# Patient Record
Sex: MALE | Race: BLACK OR AFRICAN AMERICAN | NOT HISPANIC OR LATINO | Employment: FULL TIME | URBAN - METROPOLITAN AREA
[De-identification: names, ages, dates, MRNs, and addresses within clinical notes are randomized per-mention and may not be internally consistent; named-entity substitution may affect disease eponyms.]

---

## 2018-02-11 ENCOUNTER — HOSPITAL ENCOUNTER (EMERGENCY)
Facility: HOSPITAL | Age: 43
Discharge: HOME | End: 2018-02-11
Attending: EMERGENCY MEDICINE
Payer: COMMERCIAL

## 2018-02-11 VITALS
WEIGHT: 293 LBS | HEART RATE: 75 BPM | TEMPERATURE: 99 F | OXYGEN SATURATION: 99 % | BODY MASS INDEX: 41.02 KG/M2 | HEIGHT: 71 IN | DIASTOLIC BLOOD PRESSURE: 100 MMHG | RESPIRATION RATE: 18 BRPM | SYSTOLIC BLOOD PRESSURE: 174 MMHG

## 2018-02-11 DIAGNOSIS — E11.65 POORLY CONTROLLED DIABETES MELLITUS: Primary | ICD-10-CM

## 2018-02-11 LAB
ALBUMIN SERPL BCP-MCNC: 4.1 G/DL
ALP SERPL-CCNC: 67 U/L
ALT SERPL W/O P-5'-P-CCNC: 48 U/L
ANION GAP SERPL CALC-SCNC: 14 MMOL/L
AST SERPL-CCNC: 57 U/L
B-OH-BUTYR BLD STRIP-SCNC: 0.6 MMOL/L
BASOPHILS # BLD AUTO: 0.02 K/UL
BASOPHILS NFR BLD: 0.2 %
BILIRUB SERPL-MCNC: 0.5 MG/DL
BUN SERPL-MCNC: 20 MG/DL
CALCIUM SERPL-MCNC: 11.2 MG/DL
CHLORIDE SERPL-SCNC: 94 MMOL/L
CO2 SERPL-SCNC: 23 MMOL/L
CREAT SERPL-MCNC: 1.2 MG/DL
DIFFERENTIAL METHOD: NORMAL
EOSINOPHIL # BLD AUTO: 0.1 K/UL
EOSINOPHIL NFR BLD: 1.7 %
ERYTHROCYTE [DISTWIDTH] IN BLOOD BY AUTOMATED COUNT: 13.2 %
EST. GFR  (AFRICAN AMERICAN): >60 ML/MIN/1.73 M^2
EST. GFR  (NON AFRICAN AMERICAN): >60 ML/MIN/1.73 M^2
GLUCOSE SERPL-MCNC: 412 MG/DL
HCT VFR BLD AUTO: 46.1 %
HGB BLD-MCNC: 15.4 G/DL
LYMPHOCYTES # BLD AUTO: 2.9 K/UL
LYMPHOCYTES NFR BLD: 34.8 %
MCH RBC QN AUTO: 29.1 PG
MCHC RBC AUTO-ENTMCNC: 33.4 G/DL
MCV RBC AUTO: 87 FL
MONOCYTES # BLD AUTO: 0.5 K/UL
MONOCYTES NFR BLD: 5.9 %
NEUTROPHILS # BLD AUTO: 4.8 K/UL
NEUTROPHILS NFR BLD: 57.2 %
PLATELET # BLD AUTO: 216 K/UL
PMV BLD AUTO: 11.1 FL
POCT GLUCOSE: 307 MG/DL (ref 70–110)
POCT GLUCOSE: 389 MG/DL (ref 70–110)
POTASSIUM SERPL-SCNC: 4.3 MMOL/L
PROT SERPL-MCNC: 8.5 G/DL
RBC # BLD AUTO: 5.3 M/UL
SODIUM SERPL-SCNC: 131 MMOL/L
WBC # BLD AUTO: 8.45 K/UL

## 2018-02-11 PROCEDURE — 81000 URINALYSIS NONAUTO W/SCOPE: CPT

## 2018-02-11 PROCEDURE — 82010 KETONE BODYS QUAN: CPT

## 2018-02-11 PROCEDURE — 99283 EMERGENCY DEPT VISIT LOW MDM: CPT | Mod: 25

## 2018-02-11 PROCEDURE — 80053 COMPREHEN METABOLIC PANEL: CPT

## 2018-02-11 PROCEDURE — 82962 GLUCOSE BLOOD TEST: CPT

## 2018-02-11 PROCEDURE — 96374 THER/PROPH/DIAG INJ IV PUSH: CPT

## 2018-02-11 PROCEDURE — 25000003 PHARM REV CODE 250: Performed by: EMERGENCY MEDICINE

## 2018-02-11 PROCEDURE — 85025 COMPLETE CBC W/AUTO DIFF WBC: CPT

## 2018-02-11 PROCEDURE — 63600175 PHARM REV CODE 636 W HCPCS: Performed by: EMERGENCY MEDICINE

## 2018-02-11 PROCEDURE — 96361 HYDRATE IV INFUSION ADD-ON: CPT

## 2018-02-11 RX ADMIN — INSULIN HUMAN 4 UNITS: 100 INJECTION, SOLUTION PARENTERAL at 09:02

## 2018-02-11 RX ADMIN — SODIUM CHLORIDE 2000 ML: 0.9 INJECTION, SOLUTION INTRAVENOUS at 08:02

## 2018-02-12 ENCOUNTER — HOSPITAL ENCOUNTER (EMERGENCY)
Facility: HOSPITAL | Age: 43
Discharge: HOME | End: 2018-02-12
Attending: EMERGENCY MEDICINE
Payer: COMMERCIAL

## 2018-02-12 VITALS
BODY MASS INDEX: 41.02 KG/M2 | SYSTOLIC BLOOD PRESSURE: 197 MMHG | RESPIRATION RATE: 18 BRPM | HEIGHT: 71 IN | HEART RATE: 82 BPM | OXYGEN SATURATION: 100 % | WEIGHT: 293 LBS | TEMPERATURE: 98 F | DIASTOLIC BLOOD PRESSURE: 101 MMHG

## 2018-02-12 DIAGNOSIS — I10 HYPERTENSION, UNSPECIFIED TYPE: ICD-10-CM

## 2018-02-12 DIAGNOSIS — R73.9 HYPERGLYCEMIA: Primary | ICD-10-CM

## 2018-02-12 LAB
ANION GAP SERPL CALC-SCNC: 14 MMOL/L
BACTERIA #/AREA URNS HPF: NORMAL /HPF
BILIRUB UR QL STRIP: NEGATIVE
BUN SERPL-MCNC: 20 MG/DL
CALCIUM SERPL-MCNC: 10.4 MG/DL
CHLORIDE SERPL-SCNC: 96 MMOL/L
CLARITY UR: CLEAR
CO2 SERPL-SCNC: 22 MMOL/L
COLOR UR: YELLOW
CREAT SERPL-MCNC: 1 MG/DL
EST. GFR  (AFRICAN AMERICAN): >60 ML/MIN/1.73 M^2
EST. GFR  (NON AFRICAN AMERICAN): >60 ML/MIN/1.73 M^2
GLUCOSE SERPL-MCNC: 372 MG/DL
GLUCOSE UR QL STRIP: ABNORMAL
HGB UR QL STRIP: ABNORMAL
KETONES UR QL STRIP: ABNORMAL
LEUKOCYTE ESTERASE UR QL STRIP: NEGATIVE
MICROSCOPIC COMMENT: NORMAL
NITRITE UR QL STRIP: NEGATIVE
PH UR STRIP: 6 [PH] (ref 5–8)
POCT GLUCOSE: 230 MG/DL (ref 70–110)
POCT GLUCOSE: 276 MG/DL (ref 70–110)
POCT GLUCOSE: 297 MG/DL (ref 70–110)
POCT GLUCOSE: 316 MG/DL (ref 70–110)
POCT GLUCOSE: 321 MG/DL (ref 70–110)
POTASSIUM SERPL-SCNC: 4.3 MMOL/L
PROT UR QL STRIP: NEGATIVE
RBC #/AREA URNS HPF: 1 /HPF (ref 0–4)
SODIUM SERPL-SCNC: 132 MMOL/L
SP GR UR STRIP: 1.01 (ref 1–1.03)
SQUAMOUS #/AREA URNS HPF: 2 /HPF
URN SPEC COLLECT METH UR: ABNORMAL
UROBILINOGEN UR STRIP-ACNC: NEGATIVE EU/DL
WBC #/AREA URNS HPF: 1 /HPF (ref 0–5)
YEAST URNS QL MICRO: NORMAL

## 2018-02-12 PROCEDURE — 63600175 PHARM REV CODE 636 W HCPCS: Performed by: EMERGENCY MEDICINE

## 2018-02-12 PROCEDURE — 96374 THER/PROPH/DIAG INJ IV PUSH: CPT

## 2018-02-12 PROCEDURE — 96376 TX/PRO/DX INJ SAME DRUG ADON: CPT

## 2018-02-12 PROCEDURE — 96361 HYDRATE IV INFUSION ADD-ON: CPT

## 2018-02-12 PROCEDURE — 99284 EMERGENCY DEPT VISIT MOD MDM: CPT | Mod: 25

## 2018-02-12 PROCEDURE — 82962 GLUCOSE BLOOD TEST: CPT

## 2018-02-12 PROCEDURE — 80048 BASIC METABOLIC PNL TOTAL CA: CPT

## 2018-02-12 PROCEDURE — 25000003 PHARM REV CODE 250: Performed by: EMERGENCY MEDICINE

## 2018-02-12 RX ORDER — CLONIDINE HYDROCHLORIDE 0.1 MG/1
0.1 TABLET ORAL
Status: COMPLETED | OUTPATIENT
Start: 2018-02-12 | End: 2018-02-12

## 2018-02-12 RX ORDER — SODIUM CHLORIDE 9 MG/ML
1000 INJECTION, SOLUTION INTRAVENOUS
Status: COMPLETED | OUTPATIENT
Start: 2018-02-12 | End: 2018-02-12

## 2018-02-12 RX ADMIN — SODIUM CHLORIDE 1000 ML: 0.9 INJECTION, SOLUTION INTRAVENOUS at 08:02

## 2018-02-12 RX ADMIN — INSULIN HUMAN 8 UNITS: 100 INJECTION, SOLUTION PARENTERAL at 08:02

## 2018-02-12 RX ADMIN — CLONIDINE HYDROCHLORIDE 0.1 MG: 0.1 TABLET ORAL at 01:02

## 2018-02-12 RX ADMIN — INSULIN HUMAN 6 UNITS: 100 INJECTION, SOLUTION PARENTERAL at 02:02

## 2018-02-12 NOTE — ED NOTES
2 pt identifiers for Bee Treadwell verified and correct.     Pt presents to ED with c/o hyperglycemia. Pt was seen in ED last night for same and was told if his sugar went back up to come back to ED. Pt states he woke up at 0330 with cbg 352, checked it again at 0630 and was 320's. Pt states he has been off of his metformin for several months bc he didn't have it but did not have issues with his sugar during that time. It was not until recently he started having uncontrolled CBG. Pt placed on continuous pulse ox & bp cuff. Will continue to monitor.

## 2018-02-12 NOTE — ED PROVIDER NOTES
Encounter Date: 2/12/2018    SCRIBE #1 NOTE: I, Maurice Salas, am scribing for, and in the presence of,  Dr. Miguel Allen. I have scribed the entire note.       History     Chief Complaint   Patient presents with    Hyperglycemia     elevated cbg. recently seen in ED for same compalint     Time patient was seen by the provider: 7:53 AM      The patient is a 42 y.o. male with co-morbidities including: NIDDM, HTN who presents to the ED with a complaint of hyperglycemia. Th pt is a diabetic, and he takes his medication (metformin) normally. He takes his glucose levels at home, and was concerned with his level of 397. He was seen in the ED earlier in the night for a spike, was given IV fluids and insulin and discharged home, but noticed a spike again this morning, prompting his return after being given return precautions. His DM diagnosis is relatively new. He is not in any acute distress, denies feeling any symptoms associated with the hyperglycemia other than some occasional night sweats. He takes lisinopril for his HTN. Non smoker, occasional light drinker. No history of recent surgery, no recent illness.             Review of patient's allergies indicates:   Allergen Reactions    Pcn [penicillins]      Past Medical History:   Diagnosis Date    Diabetes mellitus, type 2     Hypertension      No past surgical history on file.  No family history on file.  Social History   Substance Use Topics    Smoking status: Never Smoker    Smokeless tobacco: Never Used    Alcohol use Yes     Review of Systems   Endocrine:        Hyperglycemic, 370's   All other systems reviewed and are negative.      Physical Exam     Initial Vitals [02/12/18 0744]   BP Pulse Resp Temp SpO2   (!) 205/109 99 18 98.3 °F (36.8 °C) 96 %      MAP       141         Physical Exam    Nursing note and vitals reviewed.  Constitutional: He appears well-developed and well-nourished. He is diaphoretic (forehead, mild). No distress.   obese  A&o x4    somewhat anxious   NAD   HENT:   Head: Normocephalic and atraumatic.   Eyes: Conjunctivae are normal. Pupils are equal, round, and reactive to light.   Neck: Normal range of motion. Neck supple.   Cardiovascular: Normal rate.   , sinus tachycardia   Pulmonary/Chest: No respiratory distress.   Abdominal: He exhibits no distension.   Musculoskeletal: Normal range of motion.   1+ edema LE bilaterally   Neurological: He is alert and oriented to person, place, and time.   Psychiatric: He has a normal mood and affect.         ED Course   Procedures  Labs Reviewed - No data to display          Medical Decision Making:   Initial Assessment:   She is a 42-year-old black male with a history of hypertension and diabetes who presents for the second time in the last 12 hours for elevated blood sugar patient had been treated with insulin and saline during his prior admission however states his blood pressure got just around the 300 range when he was discharged.  Patient went home and had some eggs.  Then became extremely concerned because his lupus started climbing about 350 range again.  Patient denies any new symptoms or any recent illness no fever chills nausea vomiting diarrhea no chest pain and trouble breathing.  Patient does note some polyuria without fever chills or dysuria.  Differential Diagnosis:   Differential diagnosis uncontrolled diabetes mellitus  with hyperglycemia, diabetic ketoacidosis,metabolic derangement, viral illness,  Clinical Tests:   Lab Tests: Reviewed  Medical Tests: Reviewed  ED Management:  The patient was given intravenous saline as well as intravenous insulin to get his blood glucose down in the upper 100 range at which point he was discharged home he was told to increase his metformin dose to 500 mg twice a day and told he will need close follow-up with a endocrinologist or his primary care doctor to get his glucose under better control.  Patient also may be consideration for other  medications to control his glucose.  He's also was instructed to return should his symptoms worsen should his blood glucose today above the 300 range or should he develop normal nausea vomiting diarrhea or fever.  Patient is instructed to double the dose of his metformin now to 500 mg twice a day.  Patient also in was instructed that his blood pressure has been elevated through both with the ED visits and it is recommended that he follow-up with his primary care physician for adjustment of his blood pressure medication.  He is also instructed to check pressure at least twice daily and keep a log.                   ED Course      Clinical Impression:uncontrolled dm,htn   There were no encounter diagnoses.    Disposition:   Disposition: Discharged  Condition: Stable                        Miguel Allen MD  02/12/18 6603       Miguel Allen MD  02/12/18 9253

## 2018-02-12 NOTE — ED PROVIDER NOTES
Encounter Date: 2/11/2018       History     Chief Complaint   Patient presents with    Hyperglycemia     Patient presents to the ED with reports of having a glucose of 397 at home.     Patient is a 42-year-old male with non-insulin-dependent diabetes who says he has been having elevated blood sugars over the past couple of days.  He was out of metformin before restarting it a couple days ago.  He is currently only taking it once a day.  He denies abdominal pain, nausea or vomiting.  He denies URI symptoms.  No dysuria.      The history is provided by the patient.     Review of patient's allergies indicates:   Allergen Reactions    Pcn [penicillins]      Past Medical History:   Diagnosis Date    Diabetes mellitus, type 2     Hypertension      History reviewed. No pertinent surgical history.  History reviewed. No pertinent family history.  Social History   Substance Use Topics    Smoking status: Never Smoker    Smokeless tobacco: Never Used    Alcohol use Yes     Review of Systems   Constitutional: Negative for chills and fever.   Eyes: Negative for visual disturbance.   Respiratory: Negative for shortness of breath.    Cardiovascular: Negative for chest pain.   Gastrointestinal: Negative for abdominal pain, nausea and vomiting.   Endocrine: Positive for polydipsia and polyuria.   Neurological: Negative for light-headedness.   All other systems reviewed and are negative.      Physical Exam     Initial Vitals [02/11/18 1924]   BP Pulse Resp Temp SpO2   (!) 163/103 95 18 98.5 °F (36.9 °C) 97 %      MAP       123         Physical Exam    Nursing note and vitals reviewed.  Constitutional: He appears well-developed and well-nourished.   HENT:   Head: Normocephalic and atraumatic.   Eyes: EOM are normal. Pupils are equal, round, and reactive to light.   Neck: Normal range of motion. Neck supple.   Cardiovascular: Normal rate and regular rhythm.   Pulmonary/Chest: Breath sounds normal.   Abdominal: Soft. There is no  rebound and no guarding.   Musculoskeletal: Normal range of motion.   Neurological: He is alert and oriented to person, place, and time. No cranial nerve deficit.   Skin: Skin is warm and dry.   Psychiatric: He has a normal mood and affect.         ED Course   Procedures  Labs Reviewed   COMPREHENSIVE METABOLIC PANEL - Abnormal; Notable for the following:        Result Value    Sodium 131 (*)     Chloride 94 (*)     Glucose 412 (*)     Calcium 11.2 (*)     Total Protein 8.5 (*)     AST 57 (*)     ALT 48 (*)     All other components within normal limits   URINALYSIS - Abnormal; Notable for the following:     Specific Gravity, UA >=1.030 (*)     Protein, UA Trace (*)     Occult Blood UA 3+ (*)     Urobilinogen, UA 2.0-3.0 (*)     All other components within normal limits   BETA - HYDROXYBUTYRATE, SERUM - Abnormal; Notable for the following:     Beta-Hydroxybutyrate 0.6 (*)     All other components within normal limits   POCT GLUCOSE - Abnormal; Notable for the following:     POCT Glucose 389 (*)     All other components within normal limits   POCT GLUCOSE - Abnormal; Notable for the following:     POCT Glucose 307 (*)     All other components within normal limits   CBC W/ AUTO DIFFERENTIAL   URINALYSIS MICROSCOPIC             Medical Decision Making:   ED Management:  42-year-old non-insulin-dependent diabetic who just recently restarted metformin.  He was noted with an elevated glucose here in the ED and was given IV fluids and a small dose of insulin.  He has a normal anion gap and CO2 and I do not suspect DKA.  Urinalysis reported showed 3+ blood, although this was noted to be a lab error and patient's urine actually had 3+ glucose.  No signs of infection.  He'll be discharged in stable condition and is been advised to take metformin twice a day instead of once.  He will follow-up with his primary physician as soon as able for recheck.                   ED Course      Clinical Impression:   The encounter diagnosis  was Poorly controlled diabetes mellitus.                           Nigel Vazquez MD  02/12/18 7017

## 2018-02-12 NOTE — ED TRIAGE NOTES
Pt has hx of diabetes, states blood glucose at home was high over the past couple of days. Pt states has rx at home for metformin that he took but blood glucose did not come down. Pt reports feeling tired and sluggish. Pt states he has not followed up with dm diagnosis because he has not seen an md as he should have. Pt denies pain at this time.

## 2019-02-25 ENCOUNTER — HOSPITAL ENCOUNTER (EMERGENCY)
Facility: HOSPITAL | Age: 44
Discharge: HOME | End: 2019-02-25
Attending: EMERGENCY MEDICINE
Payer: COMMERCIAL

## 2019-02-25 VITALS
TEMPERATURE: 100 F | WEIGHT: 270 LBS | SYSTOLIC BLOOD PRESSURE: 135 MMHG | HEART RATE: 95 BPM | RESPIRATION RATE: 20 BRPM | OXYGEN SATURATION: 96 % | HEIGHT: 71 IN | BODY MASS INDEX: 37.8 KG/M2 | DIASTOLIC BLOOD PRESSURE: 81 MMHG

## 2019-02-25 DIAGNOSIS — J18.9 PNEUMONIA OF LEFT LOWER LOBE DUE TO INFECTIOUS ORGANISM: Primary | ICD-10-CM

## 2019-02-25 DIAGNOSIS — R05.9 COUGH: ICD-10-CM

## 2019-02-25 LAB
ANION GAP SERPL CALC-SCNC: 16 MMOL/L
BASOPHILS # BLD AUTO: 0.03 K/UL
BASOPHILS NFR BLD: 0.3 %
BUN SERPL-MCNC: 9 MG/DL
CALCIUM SERPL-MCNC: 9.8 MG/DL
CHLORIDE SERPL-SCNC: 94 MMOL/L
CO2 SERPL-SCNC: 27 MMOL/L
CREAT SERPL-MCNC: 1.1 MG/DL
DIFFERENTIAL METHOD: ABNORMAL
EOSINOPHIL # BLD AUTO: 0.1 K/UL
EOSINOPHIL NFR BLD: 1.4 %
ERYTHROCYTE [DISTWIDTH] IN BLOOD BY AUTOMATED COUNT: 13.3 %
EST. GFR  (AFRICAN AMERICAN): >60 ML/MIN/1.73 M^2
EST. GFR  (NON AFRICAN AMERICAN): >60 ML/MIN/1.73 M^2
GLUCOSE SERPL-MCNC: 355 MG/DL
HCT VFR BLD AUTO: 37.9 %
HGB BLD-MCNC: 12 G/DL
LYMPHOCYTES # BLD AUTO: 1.6 K/UL
LYMPHOCYTES NFR BLD: 17.2 %
MCH RBC QN AUTO: 28.4 PG
MCHC RBC AUTO-ENTMCNC: 31.7 G/DL
MCV RBC AUTO: 90 FL
MONOCYTES # BLD AUTO: 0.9 K/UL
MONOCYTES NFR BLD: 9.5 %
NEUTROPHILS # BLD AUTO: 6.8 K/UL
NEUTROPHILS NFR BLD: 71.8 %
PLATELET # BLD AUTO: 328 K/UL
PLATELET BLD QL SMEAR: ABNORMAL
PMV BLD AUTO: 10.8 FL
POCT GLUCOSE: 266 MG/DL (ref 70–110)
POCT GLUCOSE: 313 MG/DL (ref 70–110)
POTASSIUM SERPL-SCNC: 3.7 MMOL/L
RBC # BLD AUTO: 4.23 M/UL
SODIUM SERPL-SCNC: 137 MMOL/L
WBC # BLD AUTO: 9.52 K/UL

## 2019-02-25 PROCEDURE — 99284 EMERGENCY DEPT VISIT MOD MDM: CPT | Mod: 25

## 2019-02-25 PROCEDURE — 87040 BLOOD CULTURE FOR BACTERIA: CPT

## 2019-02-25 PROCEDURE — 25000003 PHARM REV CODE 250: Performed by: PHYSICIAN ASSISTANT

## 2019-02-25 PROCEDURE — 82962 GLUCOSE BLOOD TEST: CPT

## 2019-02-25 PROCEDURE — 96360 HYDRATION IV INFUSION INIT: CPT

## 2019-02-25 PROCEDURE — 85025 COMPLETE CBC W/AUTO DIFF WBC: CPT

## 2019-02-25 PROCEDURE — 96361 HYDRATE IV INFUSION ADD-ON: CPT

## 2019-02-25 PROCEDURE — 80048 BASIC METABOLIC PNL TOTAL CA: CPT

## 2019-02-25 RX ORDER — CETIRIZINE HYDROCHLORIDE 10 MG/1
10 TABLET ORAL DAILY
Qty: 30 TABLET | Refills: 0 | Status: SHIPPED | OUTPATIENT
Start: 2019-02-25 | End: 2020-06-11

## 2019-02-25 RX ORDER — ACETAMINOPHEN 500 MG
1000 TABLET ORAL
Status: COMPLETED | OUTPATIENT
Start: 2019-02-25 | End: 2019-02-25

## 2019-02-25 RX ORDER — KETOROLAC TROMETHAMINE 10 MG/1
10 TABLET, FILM COATED ORAL
Status: COMPLETED | OUTPATIENT
Start: 2019-02-25 | End: 2019-02-25

## 2019-02-25 RX ORDER — GUAIFENESIN AND DEXTROMETHORPHAN HYDROBROMIDE 1200; 60 MG/1; MG/1
1 TABLET, EXTENDED RELEASE ORAL 2 TIMES DAILY PRN
Qty: 30 TABLET | Refills: 0 | Status: SHIPPED | OUTPATIENT
Start: 2019-02-25 | End: 2019-03-07

## 2019-02-25 RX ORDER — AZITHROMYCIN 250 MG/1
500 TABLET, FILM COATED ORAL
Status: COMPLETED | OUTPATIENT
Start: 2019-02-25 | End: 2019-02-25

## 2019-02-25 RX ORDER — IBUPROFEN 600 MG/1
600 TABLET ORAL EVERY 6 HOURS PRN
Qty: 20 TABLET | Refills: 0 | Status: SHIPPED | OUTPATIENT
Start: 2019-02-25 | End: 2020-06-11

## 2019-02-25 RX ORDER — AZITHROMYCIN 250 MG/1
250 TABLET, FILM COATED ORAL DAILY
Qty: 4 TABLET | Refills: 0 | Status: SHIPPED | OUTPATIENT
Start: 2019-02-25 | End: 2019-03-01

## 2019-02-25 RX ADMIN — SODIUM CHLORIDE 1000 ML: 0.9 INJECTION, SOLUTION INTRAVENOUS at 11:02

## 2019-02-25 RX ADMIN — KETOROLAC TROMETHAMINE 10 MG: 10 TABLET, FILM COATED ORAL at 10:02

## 2019-02-25 RX ADMIN — ACETAMINOPHEN 1000 MG: 500 TABLET ORAL at 10:02

## 2019-02-25 RX ADMIN — AZITHROMYCIN 500 MG: 250 TABLET, FILM COATED ORAL at 11:02

## 2019-02-25 NOTE — ED NOTES
Pt seen in the ED complaining of a cough for the past three weeks, pt has not been able to cough up anything and he denies any blood in sputum or saliva. Pt denies any N/V, diarrhea, fever, or chills. Pt stable, will continue to monitor.

## 2019-02-28 ENCOUNTER — HOSPITAL ENCOUNTER (EMERGENCY)
Facility: HOSPITAL | Age: 44
Discharge: HOME | End: 2019-02-28
Attending: EMERGENCY MEDICINE
Payer: COMMERCIAL

## 2019-02-28 VITALS
DIASTOLIC BLOOD PRESSURE: 75 MMHG | SYSTOLIC BLOOD PRESSURE: 132 MMHG | TEMPERATURE: 100 F | HEIGHT: 71 IN | WEIGHT: 270 LBS | RESPIRATION RATE: 18 BRPM | HEART RATE: 116 BPM | OXYGEN SATURATION: 96 % | BODY MASS INDEX: 37.8 KG/M2

## 2019-02-28 DIAGNOSIS — R05.9 COUGH: ICD-10-CM

## 2019-02-28 DIAGNOSIS — R73.9 ELEVATED SERUM GLUCOSE: Primary | ICD-10-CM

## 2019-02-28 LAB — POCT GLUCOSE: 321 MG/DL (ref 70–110)

## 2019-02-28 PROCEDURE — 25000003 PHARM REV CODE 250: Performed by: PHYSICIAN ASSISTANT

## 2019-02-28 PROCEDURE — 99283 EMERGENCY DEPT VISIT LOW MDM: CPT | Mod: 25

## 2019-02-28 PROCEDURE — 82962 GLUCOSE BLOOD TEST: CPT

## 2019-02-28 RX ORDER — VALSARTAN 320 MG/1
300 TABLET ORAL DAILY
COMMUNITY
End: 2020-06-11 | Stop reason: SDUPTHER

## 2019-02-28 RX ORDER — BENZONATATE 100 MG/1
100 CAPSULE ORAL 3 TIMES DAILY PRN
Qty: 20 CAPSULE | Refills: 0 | Status: SHIPPED | OUTPATIENT
Start: 2019-02-28 | End: 2019-03-10

## 2019-02-28 RX ORDER — IBUPROFEN 600 MG/1
600 TABLET ORAL
Status: COMPLETED | OUTPATIENT
Start: 2019-02-28 | End: 2019-02-28

## 2019-02-28 RX ADMIN — IBUPROFEN 600 MG: 600 TABLET, FILM COATED ORAL at 01:02

## 2019-02-28 NOTE — ED PROVIDER NOTES
Encounter Date: 2/28/2019       History     Chief Complaint   Patient presents with    Hyperglycemia     reports glucose reading of 318. reports being dx with pneumonia Monday. reports is taking antibiotics. reports had episode of diarrhea today.      44 yo male with PMH of DM 2, HTN presents the ED with complaints of elevated blood glucose levels times 3-4 days and continued cough times 3-4 days.  Patient states he was diagnosed with pneumonia 4 days ago.  He has been taking the antibiotics and Mucinex DM as prescribed but complains of continued cough.  He states that he has been checking his BG levels daily and noticed that they are continually in the 200 and 300 range.  He is normally in the 100 range.  He states he is taking his metformin and Victoza daily as prescribed.  He denies any associated symptoms such as nausea, vomiting, fatigue, blurry vision, headache, polydipsia, polyuria.  He has not followed up with his PCP regarding this issue.      The history is provided by the patient. No  was used.     Review of patient's allergies indicates:   Allergen Reactions    Pcn [penicillins]      Past Medical History:   Diagnosis Date    Diabetes mellitus, type 2     Hypertension      History reviewed. No pertinent surgical history.  History reviewed. No pertinent family history.  Social History     Tobacco Use    Smoking status: Never Smoker    Smokeless tobacco: Never Used   Substance Use Topics    Alcohol use: Yes    Drug use: No     Review of Systems   Constitutional: Negative for chills, fatigue and fever.   HENT: Negative for congestion.    Respiratory: Positive for cough.    Gastrointestinal: Negative for abdominal pain, diarrhea, nausea and vomiting.   Endocrine: Negative for polydipsia and polyuria.   Neurological: Negative for headaches.   Psychiatric/Behavioral: Negative for confusion.   All other systems reviewed and are negative.      Physical Exam     Initial Vitals [02/28/19  0038]   BP Pulse Resp Temp SpO2   126/84 (!) 124 18 99.7 °F (37.6 °C) 96 %      MAP       --         Physical Exam    Nursing note and vitals reviewed.  Constitutional: He appears well-developed and well-nourished. He is Obese . No distress.   HENT:   Head: Normocephalic and atraumatic.   Nose: Nose normal.   Eyes: Conjunctivae are normal.   Neck: Normal range of motion.   Cardiovascular: Regular rhythm and normal heart sounds. Tachycardia present.    Pulmonary/Chest: Effort normal and breath sounds normal. He has no decreased breath sounds. He has no wheezes. He has no rales.   Abdominal: Soft. Bowel sounds are normal. There is no tenderness.   Musculoskeletal: Normal range of motion.   Neurological: He is alert and oriented to person, place, and time.   Skin: Skin is warm and dry.   Psychiatric: He has a normal mood and affect. His speech is normal and behavior is normal. Judgment and thought content normal. Cognition and memory are normal.         ED Course   Procedures  Labs Reviewed   POCT GLUCOSE - Abnormal; Notable for the following components:       Result Value    POCT Glucose 321 (*)     All other components within normal limits   POCT GLUCOSE MONITORING CONTINUOUS          Imaging Results    None          Medical Decision Making:   Initial Assessment:   42 yo male with elevated BG levels x 3-4 days and continued cough. Diagnosed with pneumonia 4 days ago, still taking azithromycin and mucinex DM. Patient appears well with no wheezing, rales, or decreased breath sounds.mild tachycardia at 116, repeat temperature noted at 100.2F, this is likely the source of the mild tachycardia. No abdominal tenderness. Moist mucous membranes.   Differential Diagnosis:   Elevated BG, pneumonia  ED Management:  Ibuprofen given in ED. No additional intervention warranted at this time as the patient is asymptomatic. He will be discharged with rx for tessalon Perles for cough relief and instructed to continue to monitor his BG  and follow up with his PCP within 1 week if they remain elevated. Strict return precautions given. Patient states understanding and agreement with treatment plan. I discussed this patient with Dr Diamond, who agrees.   Other:   I have discussed this case with another health care provider.                      Clinical Impression:       ICD-10-CM ICD-9-CM   1. Elevated serum glucose R73.9 790.29   2. Cough R05 786.2                                Michelle Pierson PA-C  02/28/19 0124

## 2019-02-28 NOTE — DISCHARGE INSTRUCTIONS
Monitor blood sugar, if it remains elevated for another week, follow up with your PCP. Return to the ER if you begin having vomiting, abdominal pain, excessive thirst or urination.

## 2019-02-28 NOTE — ED TRIAGE NOTES
Pt. To the ER with c/o having an elevated blood glucose. Pt. Was diagnosed with pneumonia last week and will be finished his course of antibiotics today. Breath sounds are clear bilaterally and skin is PWD. Pt. Denies c/o fever.

## 2019-03-02 LAB — BACTERIA BLD CULT: NORMAL

## 2019-03-07 NOTE — ED PROVIDER NOTES
Encounter Date: 2/25/2019       History     Chief Complaint   Patient presents with    Cough     c/o cough for the past 3 weeks. Also c/o pain to left lower ribs since Saturday. Also states CBG has been running in the 300's     [facility] 43 y.o. afebrile male with PMH of DM and HTN presented to the ED with c/o continued URI and cough.  He states symptoms were initially mild and included nasal congestion, postnasal drip and dry cough.  He states that this has continued for approximately 3 weeks.  He does report that he has occasional productive cough.  Does state that the cough is worse at night.  He states that he has some left-sided rib pain associated with the cough however denies any pain at rest.  Patient also to has noted that his blood glucose has been more difficult despite taking his regular medications.  He states that it has been a and approximately the 300 over the past few days.  Patient denies any definite fever however complains of chills and generalized body aches.  Patient denies any headache, shortness of breath, wheezing, nausea, vomiting, diarrhea or rash. He has not tried any medications for the symptoms today.        The history is provided by the patient.     Review of patient's allergies indicates:   Allergen Reactions    Pcn [penicillins]      Past Medical History:   Diagnosis Date    Diabetes mellitus, type 2     Hypertension      History reviewed. No pertinent surgical history.  History reviewed. No pertinent family history.  Social History     Tobacco Use    Smoking status: Never Smoker    Smokeless tobacco: Never Used   Substance Use Topics    Alcohol use: Yes    Drug use: No     Review of Systems   Constitutional: Positive for chills. Negative for activity change, appetite change and fever.   HENT: Positive for congestion, postnasal drip, sinus pressure and sore throat.    Respiratory: Positive for cough. Negative for shortness of breath and wheezing.    Cardiovascular:  Positive for chest pain (Left lateral rib pain exclusively with coughing episodes).   Gastrointestinal: Negative for nausea and vomiting.   Genitourinary: Negative for decreased urine volume and dysuria.   Musculoskeletal: Positive for arthralgias. Negative for back pain, neck pain and neck stiffness.   Skin: Negative for rash.   Neurological: Negative for dizziness, weakness, light-headedness and headaches.   Hematological: Does not bruise/bleed easily.       Physical Exam     Initial Vitals [02/25/19 0944]   BP Pulse Resp Temp SpO2   (!) 177/95 (!) 121 18 99.8 °F (37.7 °C) 96 %      MAP       --         Vitals:    02/25/19 1302   BP: 135/81   Pulse: 95   Resp:    Temp:        Physical Exam    Nursing note and vitals reviewed.  Constitutional: He appears well-developed and well-nourished. He is cooperative.  Non-toxic appearance. He appears ill. No distress.   HENT:   Head: Normocephalic and atraumatic.   Nose: Mucosal edema present.   Mouth/Throat: Mucous membranes are dry. No posterior oropharyngeal edema or posterior oropharyngeal erythema.   Eyes: Conjunctivae and lids are normal.   Neck: Trachea normal and normal range of motion. Neck supple. No stridor present.   Cardiovascular: Normal rate and regular rhythm.   Pulmonary/Chest: Breath sounds normal. No respiratory distress. He has no wheezes. He has no rhonchi.   Abdominal: Soft. Normal appearance. There is no tenderness. There is no rebound.   Lymphadenopathy:     He has no cervical adenopathy.   Neurological: He is alert and oriented to person, place, and time. GCS eye subscore is 4. GCS verbal subscore is 5. GCS motor subscore is 6.   Skin: Skin is warm, dry and intact. No rash noted.   Psychiatric: He has a normal mood and affect. His speech is normal and behavior is normal. Thought content normal.         ED Course   Procedures  Labs Reviewed   CBC W/ AUTO DIFFERENTIAL - Abnormal; Notable for the following components:       Result Value    RBC 4.23 (*)      Hemoglobin 12.0 (*)     Hematocrit 37.9 (*)     MCHC 31.7 (*)     Lymph% 17.2 (*)     All other components within normal limits   BASIC METABOLIC PANEL - Abnormal; Notable for the following components:    Chloride 94 (*)     Glucose 355 (*)     All other components within normal limits   POCT GLUCOSE - Abnormal; Notable for the following components:    POCT Glucose 266 (*)     All other components within normal limits   POCT GLUCOSE - Abnormal; Notable for the following components:    POCT Glucose 313 (*)     All other components within normal limits   CULTURE, BLOOD          Imaging Results          X-Ray Chest PA And Lateral (Final result)  Result time 02/25/19 10:28:08    Final result by Quintin Busby MD (02/25/19 10:28:08)                 Impression:      Consolidation lateral basilar segment of the left lower lobe, likely representing a pneumonia.  Follow-up radiographs to clearing are suggested.      Electronically signed by: Quintin Busby MD  Date:    02/25/2019  Time:    10:28             Narrative:    EXAMINATION:  XR CHEST PA AND LATERAL    CLINICAL HISTORY:  Cough    TECHNIQUE:  PA and lateral views of the chest were performed.    COMPARISON:  None    FINDINGS:  There is an extensive airspace consolidation noted in the lateral basilar segment of the left lower lobe, likely representing a pneumonia.  Slight increased coarse markings in the posterior basilar segment of the left lower lobe.  Left upper lung zone is clear.  Right lung is clear.  Heart size is within normal limits.  Mediastinum is unremarkable.  Mild spondylotic changes in the dorsal spine.                                [facility] 43 [facility] afebrile male with PMH of DM and HTN presented to the ED with c/o continued URI and cough.  He states symptoms were initially mild and included nasal congestion, postnasal drip and dry cough.  He states that this has continued for approximately 3 weeks.  He does report that he has occasional  productive cough.  Does state that the cough is worse at night.  He states that he has some left-sided rib pain associated with the cough however denies any pain at rest.  Patient also to has noted that his blood glucose has been more difficult despite taking his regular medications.  He states that it has been a and approximately the 300 over the past few days.  Patient denies any definite fever however complains of chills and generalized body aches.  Patient denies any headache, shortness of breath, wheezing, nausea, vomiting, diarrhea or rash. He has not tried any medications for the symptoms today.  ROS positive for URI symptoms.  Physical exam reveals patient that appears ill but nontoxic. TM's with bilateral serous otitis media; nose with congestion and rhinorrhea. Oropharynx with mild erythema; no edema or exudate; mildly dry mucous membranes. Lungs clear and free of wheeze. Heart with some tachycardia that did improve throughout ED course; normal rhythm. Abdomen is soft and nontender with normal bowel sounds. FROM of neck, no lymphadenopathy and FROM of all extremities with strength 5/5 bilaterally. Skin free of rash, pallor and diaphoresis.    DDX: influenza, viral URI with cough, bronchitis, pneumonia    ED management:  No flu swab as symptom duration place this patient out of Tamiflu treatment window.  Patient was noted to be hyperglycemic in triage.  With patient's comorbidities and chest x-ray findings of early developing pneumonia labs were ordered.  Patient's curb score is 0 and believe he would benefit from outpatient treatment.  First dose of antibiotics was given in the ED.  Initial modest improvement in symptoms with Tylenol and Toradol.  Moderate improvement with IV fluids.  No further labs or imaging warranted at this time as patient remains well appearing and in no distress at this time.  We will send home with supportive medications and antibiotics for left lower lobe pneumonia and instructed  to follow up with his PCP. Instructed patient on fever and symptom control.      Impression/Plan: The primary encounter diagnosis was Pneumonia of left lower lobe due to infectious organism. A diagnosis of Cough was also pertinent to this visit. Discharged with Motrin, Zyrtec, Mucinex DM and Zithromax . Patient will follow up with Primary.  Patient cautioned on when to return to ED.  Pt. Understands and agrees with current treatment plan                                              Clinical Impression:       ICD-10-CM ICD-9-CM   1. Pneumonia of left lower lobe due to infectious organism J18.1 486   2. Cough R05 786.2                                GILBERTO Murray  03/06/19 2101

## 2020-06-11 PROBLEM — I10 ESSENTIAL HYPERTENSION: Status: ACTIVE | Noted: 2018-02-09

## 2020-06-11 PROBLEM — E11.9 TYPE 2 DIABETES MELLITUS WITHOUT COMPLICATION: Status: ACTIVE | Noted: 2018-02-09

## 2020-06-11 PROBLEM — M10.9 GOUT: Status: ACTIVE | Noted: 2020-06-11

## 2022-02-06 ENCOUNTER — HOSPITAL ENCOUNTER (EMERGENCY)
Facility: HOSPITAL | Age: 47
Discharge: HOME | End: 2022-02-06
Attending: EMERGENCY MEDICINE

## 2022-02-06 VITALS
HEART RATE: 95 BPM | RESPIRATION RATE: 18 BRPM | WEIGHT: 258 LBS | DIASTOLIC BLOOD PRESSURE: 98 MMHG | TEMPERATURE: 98 F | SYSTOLIC BLOOD PRESSURE: 188 MMHG | HEIGHT: 71 IN | OXYGEN SATURATION: 96 % | BODY MASS INDEX: 36.12 KG/M2

## 2022-02-06 DIAGNOSIS — I15.9 SECONDARY HYPERTENSION: Primary | ICD-10-CM

## 2022-02-06 DIAGNOSIS — M10.9 ACUTE GOUT OF LEFT ELBOW, UNSPECIFIED CAUSE: ICD-10-CM

## 2022-02-06 PROCEDURE — 99284 EMERGENCY DEPT VISIT MOD MDM: CPT | Mod: 25

## 2022-02-06 PROCEDURE — 96372 THER/PROPH/DIAG INJ SC/IM: CPT

## 2022-02-06 PROCEDURE — 63600175 PHARM REV CODE 636 W HCPCS: Performed by: EMERGENCY MEDICINE

## 2022-02-06 RX ORDER — KETOROLAC TROMETHAMINE 30 MG/ML
30 INJECTION, SOLUTION INTRAMUSCULAR; INTRAVENOUS
Status: COMPLETED | OUTPATIENT
Start: 2022-02-06 | End: 2022-02-06

## 2022-02-06 RX ORDER — INDOMETHACIN 50 MG/1
50 CAPSULE ORAL
Qty: 21 CAPSULE | Refills: 0 | Status: SHIPPED | OUTPATIENT
Start: 2022-02-06 | End: 2022-02-06 | Stop reason: SDUPTHER

## 2022-02-06 RX ORDER — INDOMETHACIN 50 MG/1
50 CAPSULE ORAL
Qty: 21 CAPSULE | Refills: 0 | Status: SHIPPED | OUTPATIENT
Start: 2022-02-06 | End: 2022-02-13

## 2022-02-06 RX ORDER — LOSARTAN POTASSIUM AND HYDROCHLOROTHIAZIDE 25; 100 MG/1; MG/1
1 TABLET ORAL DAILY
Qty: 90 TABLET | Refills: 3 | Status: SHIPPED | OUTPATIENT
Start: 2022-02-06 | End: 2023-02-06

## 2022-02-06 RX ADMIN — KETOROLAC TROMETHAMINE 30 MG: 30 INJECTION, SOLUTION INTRAMUSCULAR at 08:02

## 2022-02-06 NOTE — Clinical Note
"Bee Lopez" Mile was seen and treated in our emergency department on 2/6/2022.  He may return to work on 02/07/2022.       If you have any questions or concerns, please don't hesitate to call.      ALEJANDRA Donovan RN    "
"Bee Lopez" Mile was seen and treated in our emergency department on 2/6/2022.  He may return to work on 02/09/2022.       If you have any questions or concerns, please don't hesitate to call.      Jersey Daugherty MD"
118

## 2022-02-06 NOTE — ED NOTES
Present to ED with left elbow swelling x 3 days. Took Ibuprofen but no relief. Denies injury. Hx of Gout. No distress noted.

## 2022-02-06 NOTE — ED PROVIDER NOTES
"  Chief Complaint: out of BP meds and gout to his left elbow.      History of Present Illness:    Bee Treadwell 46 y.o. with a  has a past medical history of Diabetes mellitus, type 2 and Hypertension. and gout who presents to the emergency department today with a complaint of being out of his BP meds and having gout to his left elbow. Pt has been out of his meds for several months. He has no chest pain, shortness of breath, change in vision, headaches. He started with pain to his left elbow 3 days ago, + swelling, no injury or trauma, no drainage, no warmth, no fevers. Similar to previous episodes of gout.     ROS    Constitutional: No fever, no chills.  Eyes: No discharge. No pain.  HENT: No nasal drainage. No ear ache. No sore throat.  Cardiovascular: No chest pain, no palpitations.  Respiratory: No cough, no shortness of breath.  Gastrointestinal: No abdominal pain, no vomiting. No diarrhea.  Genitourinary: No hematuria, dysuria, urgency.  Musculoskeletal: No back pain.   Skin: No rashes, no lesions.  Neurological: No headache, no focal weakness.    Otherwise remaining ROS negative     The history is provided by the patient      ALLERGIES REVIEWED  MEDICATIONS REVIEWED  PMH/PSH/SOC/FH REVIEWED :  Bee Treadwell  has a past medical history of Diabetes mellitus, type 2 and Hypertension. and   has no past surgical history on file. with  reports that he has never smoked. He has never used smokeless tobacco. He reports current alcohol use. He reports that he does not use drugs. and a family history is not on file.      Nursing/Ancillary staff note reviewed.  VS reviewed         Physical Exam     BP (!) 188/98 (BP Location: Right arm, Patient Position: Sitting)   Pulse 95   Temp 98.2 °F (36.8 °C) (Oral)   Resp 18   Ht 5' 11" (1.803 m)   Wt 117 kg (258 lb)   SpO2 96%   BMI 35.98 kg/m²     Physical Exam  Musculoskeletal:        Arms:          General Appearance: The patient is alert, has no immediate " need for airway protection and no signs of toxicity. No acute distress. Lying in bed but able to sit up without difficulty.   HEENT: Eyes: Pupils equal and round no pallor or injection. Extra ocular movements intact. No drainage.       Mouth: Mucous membranes are moist. Oropharynx clear.   Neck:Neck is supple non-tender. No lymphadenopathy. No stridor.   Respiratory: There are no retractions, lungs are clear to auscultation. No wheezing, no crackles. Chest wall nontender to palpation.   Cardiovascular: Regular rate and rhythm. No murmurs, rubs or gallops.  Gastrointestinal:  Abdomen is soft and non-tender, no masses, bowel sounds normal. No guarding, no rebound.  No pulsatile mass.   Neurological: Alert and oriented x 4. CN II-XII grossly intact. No focal weakness. Strength intact 5/5 bilaterally in upper and lower extremities.   Skin: Warm and dry, no rashes.      DIFFERENTIAL DIAGNOSIS: After history and physical exam a differential diagnosis was considered, but was not limited to, hypertension, medication noncompliance, progression of HTN, renal insufficiency, renal vasculature stenosis, gout, fracture, septic arthritis, septic joint.         ED Course     Medications   ketorolac injection 30 mg (30 mg Intramuscular Given 2/6/22 0830)                     Medical Decision Making:   History:   I obtained history from:  The patient  Old Medical Records: I decided to obtain old medical records.   Reviewed and summarized the old medical record and it showed pt was seen at [facility] 3/23/31 and hsi BP meds at that time were losartan 100mg-HCTZ 25mg daily.       ED Management:  Bee Treadwell  presents to the emergency Department today with need for med refill and meds for his gout. He is asymptomatic is respects to his BP I will refill his meds. He has gout to the elft elbow. He has FROM. Doubt septic arthritis, septic joint, abscess. Will write for pain control. He will followup with his PCP. The pt  is com fortable with this plan and comfortable going home at this time. After taking into careful account the historical factors and physical exam findings of the patient's presentation today, in conjunction with the empirical and objective data obtained on ED workup, no acute emergent medical condition requiring admission has been identified. The patient appears to be low risk for an emergent medical condition and I feel it is safe and appropriate at this time for the patient to be discharged to follow-up as detailed in their discharge instructions for reevaluation and possible continued outpatient workup and management. Regardless, an unremarkable evaluation in the ED does not preclude the development or presence of a serious or life threatening condition. As such, patient was instructed to return immediately for any worsening or change in current symptoms. Precautions for return discussed at length.  Discharge and follow-up instructions discussed with the patient who expressed understanding and willingness to comply with my recommendations.    Voice recognition software utilized in this note.              Impression      The primary encounter diagnosis was Secondary hypertension. A diagnosis of Acute gout of left elbow, unspecified cause was also pertinent to this visit.                Discharge Medication List as of 2/6/2022  8:24 AM      START taking these medications    Details   losartan-hydrochlorothiazide 100-25 mg (HYZAAR) 100-25 mg per tablet Take 1 tablet by mouth once daily., Starting Sun 2/6/2022, Until Mon 2/6/2023, Normal              Follow-up Information     Schedule an appointment as soon as possible for a visit  with Callum Carrillo MD.    Specialty: Internal Medicine  Contact information:  [address]  [address]  [facility] [id number] [phone]                                        Jersey Daugherty MD  02/06/22 9774

## 2024-11-20 ENCOUNTER — HOSPITAL ENCOUNTER (EMERGENCY)
Facility: HOSPITAL | Age: 49
Discharge: HOME | End: 2024-11-20
Attending: STUDENT IN AN ORGANIZED HEALTH CARE EDUCATION/TRAINING PROGRAM

## 2024-11-20 VITALS
TEMPERATURE: 99 F | BODY MASS INDEX: 37.1 KG/M2 | HEART RATE: 89 BPM | RESPIRATION RATE: 18 BRPM | DIASTOLIC BLOOD PRESSURE: 104 MMHG | WEIGHT: 265 LBS | HEIGHT: 71 IN | OXYGEN SATURATION: 95 % | SYSTOLIC BLOOD PRESSURE: 200 MMHG

## 2024-11-20 DIAGNOSIS — S89.92XA LEFT KNEE INJURY, INITIAL ENCOUNTER: ICD-10-CM

## 2024-11-20 PROCEDURE — 96372 THER/PROPH/DIAG INJ SC/IM: CPT | Performed by: STUDENT IN AN ORGANIZED HEALTH CARE EDUCATION/TRAINING PROGRAM

## 2024-11-20 PROCEDURE — 63600175 PHARM REV CODE 636 W HCPCS: Performed by: STUDENT IN AN ORGANIZED HEALTH CARE EDUCATION/TRAINING PROGRAM

## 2024-11-20 PROCEDURE — 25000003 PHARM REV CODE 250: Performed by: STUDENT IN AN ORGANIZED HEALTH CARE EDUCATION/TRAINING PROGRAM

## 2024-11-20 PROCEDURE — 99284 EMERGENCY DEPT VISIT MOD MDM: CPT | Mod: 25

## 2024-11-20 RX ORDER — KETOROLAC TROMETHAMINE 30 MG/ML
30 INJECTION, SOLUTION INTRAMUSCULAR; INTRAVENOUS
Status: COMPLETED | OUTPATIENT
Start: 2024-11-20 | End: 2024-11-20

## 2024-11-20 RX ORDER — INDOMETHACIN 50 MG/1
50 CAPSULE ORAL
Qty: 15 CAPSULE | Refills: 0 | Status: SHIPPED | OUTPATIENT
Start: 2024-11-20 | End: 2024-11-25

## 2024-11-20 RX ORDER — OXYCODONE AND ACETAMINOPHEN 5; 325 MG/1; MG/1
1 TABLET ORAL
Status: COMPLETED | OUTPATIENT
Start: 2024-11-20 | End: 2024-11-20

## 2024-11-20 RX ORDER — METHYLPREDNISOLONE 4 MG/1
TABLET ORAL
Qty: 21 EACH | Refills: 0 | Status: SHIPPED | OUTPATIENT
Start: 2024-11-20

## 2024-11-20 RX ORDER — DEXAMETHASONE SODIUM PHOSPHATE 4 MG/ML
10 INJECTION, SOLUTION INTRA-ARTICULAR; INTRALESIONAL; INTRAMUSCULAR; INTRAVENOUS; SOFT TISSUE
Status: COMPLETED | OUTPATIENT
Start: 2024-11-20 | End: 2024-11-20

## 2024-11-20 RX ADMIN — OXYCODONE HYDROCHLORIDE AND ACETAMINOPHEN 1 TABLET: 5; 325 TABLET ORAL at 04:11

## 2024-11-20 RX ADMIN — KETOROLAC TROMETHAMINE 30 MG: 30 INJECTION, SOLUTION INTRAMUSCULAR; INTRAVENOUS at 04:11

## 2024-11-20 RX ADMIN — DEXAMETHASONE SODIUM PHOSPHATE 10 MG: 4 INJECTION, SOLUTION INTRA-ARTICULAR; INTRALESIONAL; INTRAMUSCULAR; INTRAVENOUS; SOFT TISSUE at 04:11

## 2024-11-20 NOTE — ED NOTES
Pt to ER with C/O left knee pain/swelling.  Pt states sitting on side of bed Saturday and felt pop in his knee.  Pt states continued working.  Pt states pain has worsened.  Pt with 2+ pedal pulse to LLE.  Pt A/O x 3.

## 2024-11-20 NOTE — ED NOTES
Pt left knee and left elbow ace wrapped.  Pt tolerated well.  Pt given crutches and educated on use.  Pt verbalized understanding

## 2024-11-22 NOTE — ED PROVIDER NOTES
ED Provider Note - 11/20/2024    History     Chief Complaint   Patient presents with    Knee Pain     Patient reports left knee pain that started Saturday after he felt a pop and it has been swelling up the last day or two. He denies recent trauma. He states he has a hx of gout. Denies fever, numbness/tingling below the site. Denies dyspnea. Patient also c/o swelling to his left elbow.        HPI     Bee Treadwell is a 49 y.o. year old male with past medical and surgical history as seen below, presenting with chief complaint of knee pain.  Left-sided.  No particular injury or trauma but it does remember knee popping when he got out of bed over the past couple of days.  Associated with knee swelling.    Patient also with swelling to the distal aspect of left elbow.  Has been present for several days and seems to be slightly improving.        Past Medical History:   Diagnosis Date    Diabetes mellitus, type 2     Hypertension      History reviewed. No pertinent surgical history.      No family history on file.  Social History     Tobacco Use    Smoking status: Never    Smokeless tobacco: Never   Substance Use Topics    Alcohol use: Not Currently    Drug use: No     Social Drivers of Health with Concerns     Alcohol Use: Not on file   Financial Resource Strain: Not on file   Food Insecurity: Not on file   Transportation Needs: Not on file   Physical Activity: Not on file   Stress: Not on file   Housing Stability: Not on file   Utilities: Not on file   Health Literacy: Not on file   Social Isolation: Not on file      Review of patient's allergies indicates:   Allergen Reactions    Pcn [penicillins]        Review of Systems     A full Review of Systems (ROS) was performed and was negative unless otherwise stated in the HPI.      Physical Exam     Vitals:    11/20/24 0335 11/20/24 0438 11/20/24 0610   BP: (!) 186/104  (!) 200/104   BP Location: Left arm     Pulse: 110  89   Resp: 18 18    Temp: 98.6 °F (37 °C)    "  TempSrc: Oral     SpO2: 95%  95%   Weight: 120.2 kg (265 lb)     Height: 5' 11" (1.803 m)          Physical Exam    Nursing note and vitals reviewed.  Constitutional: He appears well-developed and well-nourished. No distress.   HENT:   Head: Normocephalic and atraumatic.   Right Ear: External ear normal.   Left Ear: External ear normal.   Nose: Nose normal.   Eyes: Conjunctivae and EOM are normal. Pupils are equal, round, and reactive to light.   Neck: Neck supple.   Normal range of motion.  Cardiovascular:  Normal rate and regular rhythm.           Pulmonary/Chest: Breath sounds normal. No respiratory distress.   Musculoskeletal:         General: No edema. Normal range of motion.      Right elbow: Swelling (Bursitis) present.      Cervical back: Normal range of motion and neck supple.      Left knee: Effusion present. MCL laxity (mild) present.      Comments: Left elbow bursitis without erythema and not hot to touch.     Neurological: He is alert and oriented to person, place, and time. He has normal strength. No cranial nerve deficit or sensory deficit.   Skin: Skin is warm and dry. No rash noted.   Psychiatric: He has a normal mood and affect. Thought content normal.         Lab Results- Independently reviewed by myself      Labs Reviewed - No data to display        Imaging     Imaging Results              X-Ray Knee 3 View Left (Final result)  Result time 11/22/24 09:10:33      Final result by RADIOLOGIST, VIRTUAL (11/22/24 09:10:33)                   Impression:      Moderate knee effusion. No acute fracture or dislocation      Electronically signed by: Annette Radiologist  Date:    11/22/2024  Time:    09:10               Narrative:    EXAMINATION:  XR Left Knee    CLINICAL HISTORY:  Pain; Knee; Left    TECHNIQUE:  Imaging protocol: Radiologic exam of the left knee. Views: 3 views.    COMPARISON:  No relevant prior studies available    FINDINGS:  Bones/joints: No acute fracture or dislocation. Normal " anatomic alignment. Mild tricompartmental degenerative changes. Fabella. Moderate knee effusion. Chronic fracture deformity of the proximal tibia versus syndesmotic injury. Soft tissues: Mild soft tissue swelling of the knee.                                    X-Rays:   Independently Interpreted Readings:   Other Readings:  Independent Interpretation of XR:   No acute fractures or dislocations                ED Course         Procedures         Orders Placed This Encounter    ORTHOPEDIC BRACING FOR HOME USE - LOWER EXTREMITY    X-Ray Knee 3 View Left    Apply ace wrap    ketorolac injection 30 mg    oxyCODONE-acetaminophen 5-325 mg per tablet 1 tablet    dexAMETHasone injection 10 mg    indomethacin (INDOCIN) 50 MG capsule    methylPREDNISolone (MEDROL DOSEPACK) 4 mg tablet          ED Course as of 11/22/24 0922   Wed Nov 20, 2024   0557 Exam: XR Left Knee Exam date and time: 11/20/2024 4:54 AM Age: 49 years old Clinical indication: Pain; Knee; Left TECHNIQUE: Imaging protocol: Radiologic exam of the left knee. Views: 3 views. COMPARISON: No relevant prior studies available.   FINDINGS: Bones/joints: No acute fracture or dislocation. Normal anatomic alignment. Mild tricompartmental degenerative changes. Fabella. Moderate knee effusion. Chronic fracture deformity of the proximal tibia versus syndesmotic injury. Soft tissues: Mild soft tissue swelling of the knee.   IMPRESSION: Moderate knee effusion. No acute fracture or dislocation. Dictated and Authenticated by: Adriana Nelson MD 11/20/2024 5:50 AM Central Time ([place] & [place])   [KB]      ED Course User Index  [KB] Wu Fiore MD              Medical Decision Making       The patient's list of active medical problems, social history, medications, and allergies as documented per RN staff has been reviewed.           Medical Decision Making  This patient presents with left knee pain without significant known trauma. Good ROM and without remarkable findings on XR.      Differential includes Muscular strain, contusion, fracture, dislocation, ligamental, or tendon injuries.  Differential also includes gouty arthritis.  Much lower suspicion of septic effusion/arthritis.  Patient also with elbow bursitis that does not appear to be septic or infected.    Discussed limitations of XR/CT imaging in regards to soft tissue/ligamental/tendon injuries. Patient is to f/u with PCP/ortho for reevaluation and determination of need for advanced imaging.     Pain improved with treatment in the ED    Advised RICE therapy    Told to return to ED if symptoms worsen, return, or change in character.     Amount and/or Complexity of Data Reviewed  External Data Reviewed: notes.  Radiology: ordered and independent interpretation performed.    Risk  Prescription drug management.                    ED Prescriptions       Medication Sig Dispense Start Date End Date Auth. Provider    indomethacin (INDOCIN) 50 MG capsule Take 1 capsule (50 mg total) by mouth 3 (three) times daily with meals. for 5 days 15 capsule 11/20/2024 11/25/2024 Wu Fiore MD    methylPREDNISolone (MEDROL DOSEPACK) 4 mg tablet use as directed 21 each 11/20/2024 -- Wu Fiore MD              Clinical Impression       Follow-up Information       Follow up With Specialties Details Why Contact Info Additional Information    [facility] Orthopedics Schedule an appointment as soon as possible for a visit in 1 week For follow-up on today's visit. [address]  [address]  [facility] [address] [phone] Please park in [address] or [address] and use [address]. Take Medical Office Bldg. elevators.    [facility] Emergency Dept Emergency Medicine Go to  As needed, If symptoms worsen [address] [facility] [address] [phone]             Referrals:  No orders of the defined types were placed in this encounter.      Disposition   ED Disposition Condition    Discharge Stable              Final  diagnoses:  [S89.92XA] Left knee injury, initial encounter        Wu Fiore MD        11/22/2024          DISCLAIMER: This note was prepared with [email or web address] voice recognition transcription software. Garbled syntax, mangled pronouns, and other bizarre constructions may be attributed to that software system.       Wu Fiore MD  11/22/24 1567

## 2025-03-27 ENCOUNTER — OCCUPATIONAL HEALTH (OUTPATIENT)
Dept: URGENT CARE | Facility: CLINIC | Age: 50
End: 2025-03-27

## 2025-03-27 VITALS — BODY MASS INDEX: 36.68 KG/M2 | HEIGHT: 71 IN | WEIGHT: 262 LBS

## 2025-03-27 DIAGNOSIS — Z00.00 ENCOUNTER FOR PHYSICAL EXAMINATION: Primary | ICD-10-CM

## 2025-03-27 LAB
BREATH ALCOHOL: 0
GLUCOSE SERPL-MCNC: 185 MG/DL (ref 70–110)

## 2025-03-27 RX ORDER — SEMAGLUTIDE 1.34 MG/ML
INJECTION, SOLUTION SUBCUTANEOUS
COMMUNITY